# Patient Record
Sex: MALE | Race: WHITE
[De-identification: names, ages, dates, MRNs, and addresses within clinical notes are randomized per-mention and may not be internally consistent; named-entity substitution may affect disease eponyms.]

---

## 2020-04-07 ENCOUNTER — HOSPITAL ENCOUNTER (INPATIENT)
Dept: HOSPITAL 92 - ERS | Age: 55
LOS: 3 days | Discharge: HOME | DRG: 871 | End: 2020-04-10
Attending: FAMILY MEDICINE | Admitting: FAMILY MEDICINE
Payer: COMMERCIAL

## 2020-04-07 VITALS — BODY MASS INDEX: 26.6 KG/M2

## 2020-04-07 DIAGNOSIS — J18.9: ICD-10-CM

## 2020-04-07 DIAGNOSIS — A41.9: Primary | ICD-10-CM

## 2020-04-07 DIAGNOSIS — Z20.828: ICD-10-CM

## 2020-04-07 DIAGNOSIS — J96.01: ICD-10-CM

## 2020-04-07 LAB
ALBUMIN SERPL BCG-MCNC: 3.6 G/DL (ref 3.5–5)
ALP SERPL-CCNC: 110 U/L (ref 40–110)
ALT SERPL W P-5'-P-CCNC: 85 U/L (ref 8–55)
ANION GAP SERPL CALC-SCNC: 18 MMOL/L (ref 10–20)
APTT PPP: 34.3 SEC (ref 22.9–36.1)
AST SERPL-CCNC: 75 U/L (ref 5–34)
BILIRUB SERPL-MCNC: 1.5 MG/DL (ref 0.2–1.2)
BUN SERPL-MCNC: 21 MG/DL (ref 8.4–25.7)
CALCIUM SERPL-MCNC: 9.5 MG/DL (ref 7.8–10.44)
CHLORIDE SERPL-SCNC: 99 MMOL/L (ref 98–107)
CO2 SERPL-SCNC: 23 MMOL/L (ref 22–29)
CREAT CL PREDICTED SERPL C-G-VRATE: 0 ML/MIN (ref 70–130)
D DIMER PPP FEU-MCNC: 1.92 *MCG/ML (ref 0.27–0.43)
GLOBULIN SER CALC-MCNC: 4.6 G/DL (ref 2.4–3.5)
GLUCOSE SERPL-MCNC: 109 MG/DL (ref 70–105)
HGB BLD-MCNC: 16.6 G/DL (ref 14–18)
INR PPP: 1.2
MAGNESIUM SERPL-MCNC: 2.6 MG/DL (ref 1.6–2.6)
MCH RBC QN AUTO: 28.7 PG (ref 27–31)
MCV RBC AUTO: 89.7 FL (ref 78–98)
MDIFF COMPLETE?: YES
PLATELET # BLD AUTO: 450 THOU/UL (ref 130–400)
POTASSIUM SERPL-SCNC: 3.7 MMOL/L (ref 3.5–5.1)
PROTHROMBIN TIME: 15.3 SEC (ref 12–14.7)
RBC # BLD AUTO: 5.79 MILL/UL (ref 4.7–6.1)
SODIUM SERPL-SCNC: 136 MMOL/L (ref 136–145)
WBC # BLD AUTO: 15.1 THOU/UL (ref 4.8–10.8)

## 2020-04-07 PROCEDURE — 87635 SARS-COV-2 COVID-19 AMP PRB: CPT

## 2020-04-07 PROCEDURE — 80053 COMPREHEN METABOLIC PANEL: CPT

## 2020-04-07 PROCEDURE — 87899 AGENT NOS ASSAY W/OPTIC: CPT

## 2020-04-07 PROCEDURE — 83735 ASSAY OF MAGNESIUM: CPT

## 2020-04-07 PROCEDURE — 86140 C-REACTIVE PROTEIN: CPT

## 2020-04-07 PROCEDURE — 82728 ASSAY OF FERRITIN: CPT

## 2020-04-07 PROCEDURE — 93005 ELECTROCARDIOGRAM TRACING: CPT

## 2020-04-07 PROCEDURE — 85025 COMPLETE CBC W/AUTO DIFF WBC: CPT

## 2020-04-07 PROCEDURE — 84100 ASSAY OF PHOSPHORUS: CPT

## 2020-04-07 PROCEDURE — 96365 THER/PROPH/DIAG IV INF INIT: CPT

## 2020-04-07 PROCEDURE — 87149 DNA/RNA DIRECT PROBE: CPT

## 2020-04-07 PROCEDURE — 8E0ZXY6 ISOLATION: ICD-10-PCS | Performed by: FAMILY MEDICINE

## 2020-04-07 PROCEDURE — 80048 BASIC METABOLIC PNL TOTAL CA: CPT

## 2020-04-07 PROCEDURE — 96367 TX/PROPH/DG ADDL SEQ IV INF: CPT

## 2020-04-07 PROCEDURE — 83615 LACTATE (LD) (LDH) ENZYME: CPT

## 2020-04-07 PROCEDURE — 84484 ASSAY OF TROPONIN QUANT: CPT

## 2020-04-07 PROCEDURE — U0003 INFECTIOUS AGENT DETECTION BY NUCLEIC ACID (DNA OR RNA); SEVERE ACUTE RESPIRATORY SYNDROME CORONAVIRUS 2 (SARS-COV-2) (CORONAVIRUS DISEASE [COVID-19]), AMPLIFIED PROBE TECHNIQUE, MAKING USE OF HIGH THROUGHPUT TECHNOLOGIES AS DESCRIBED BY CMS-2020-01-R: HCPCS

## 2020-04-07 PROCEDURE — 87449 NOS EACH ORGANISM AG IA: CPT

## 2020-04-07 PROCEDURE — 87633 RESP VIRUS 12-25 TARGETS: CPT

## 2020-04-07 PROCEDURE — 85379 FIBRIN DEGRADATION QUANT: CPT

## 2020-04-07 PROCEDURE — 85610 PROTHROMBIN TIME: CPT

## 2020-04-07 PROCEDURE — 36415 COLL VENOUS BLD VENIPUNCTURE: CPT

## 2020-04-07 PROCEDURE — 84145 PROCALCITONIN (PCT): CPT

## 2020-04-07 PROCEDURE — 87040 BLOOD CULTURE FOR BACTERIA: CPT

## 2020-04-07 PROCEDURE — 83605 ASSAY OF LACTIC ACID: CPT

## 2020-04-07 PROCEDURE — 85730 THROMBOPLASTIN TIME PARTIAL: CPT

## 2020-04-07 RX ADMIN — CEFTRIAXONE SCH MLS: 1 INJECTION, POWDER, FOR SOLUTION INTRAMUSCULAR; INTRAVENOUS at 21:15

## 2020-04-07 RX ADMIN — AZITHROMYCIN SCH MLS: 500 INJECTION, POWDER, LYOPHILIZED, FOR SOLUTION INTRAVENOUS at 21:15

## 2020-04-07 NOTE — PDOC.FPRHP
- History of Present Illness


Chief Complaint: cough


History of Present Illness: 





53yo previously healthy M presents with 14 day hx of cough, fever, (

consistently above 101), generalized weakness, and decrease PO solid intake. Pt 

denies SOB or CP. 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 

notably ill patients or other sick contacts recently. Denies any other related 

symptoms. Tylenol is somewhat palliating. No other transforming factors.


ED Course: 


Pt was given 1g Tylenol PO, 1L NS IV, 2g Cefepime IV, and 1g Vanc IV








- Allergies/Adverse Reactions


 Allergies











Allergy/AdvReac Type Severity Reaction Status Date / Time


 


No Known Allergies Allergy   Unverified 04/07/20 20:18














- Home Medications


 











 Medication  Instructions  Recorded  Confirmed  Type


 


Acetaminophen [Tylenol Extra 1,000 mg PO Q6H PRN  tab 04/10/20  Rx





Strength]    


 


Amoxicillin 1,000 mg PO TID #12 tablet 04/10/20  Rx


 


Azithromycin 250 mg PO DAILY #2 tablet 04/10/20  Rx


 


Benzonatate [Tessalon] 100 mg PO Q4H PRN #36 cap 04/10/20  Rx














- History


PMHx: none


 


PSHx: Hernia repair





FHx: non contributory


 


Social: denies TAD





Allergies: NKDA





CODE: FULL


 








- Review of Systems


General: reports: fever/chills, weight/appetite/sleep changes


Eyes: denies: eye pain, vision changes


ENT: denies: nasal congestion, rhinorrhea


Respiratory: reports: cough, congestion.  denies: shortness of breath


Cardiovascular: denies: chest pain, palpitation


Gastrointestinal: denies: nausea, vomiting


Genitourinary: denies: incontinence, dysuria


Skin: denies: rashes, lesions


Musculoskeletal: denies: pain, tenderness


Neurological: denies: syncope, seizure


Psychological: denies: anxiety, depression





- Vital signs


BP: 138/88  HR: 77 RR: 24 Tmax: 100.4 Pox: 93% on 3L  Wt: 90.72kg   








- Physical Exam


Constitutional: awake, alert and oriented, other (mild distress, diaphoretic)


HEENT: EOMI, grossly normal vision, grossly normal hearing


Neck: supple, trachea midline


Chest: no lesions


Heart: RRR, normal S1/S2


Lungs: no wheezing


-Lungs: 





bilateral inspiratory and expiratory crackles diffusely, egophany wnl


Abdomen: soft, non-tender


Musculoskeletal: normal structure, normal tone


Neurological: no focal deficit, normal sensation


Skin: no rash/lesions, good turgor


Heme/Lymphatic: no unusual bruising or bleeding, no purpura


Psychiatric: normal mood and affect, good judgment and insight





FMR H&P: Results





- Labs


Result Diagrams: 


 04/10/20 06:31





 04/10/20 06:31


Lab results: 


 











WBC  15.1 thou/uL (4.8-10.8)  H  04/07/20  14:38    


 


Hgb  16.6 g/dL (14.0-18.0)   04/07/20  14:38    


 


Hct  51.9 % (42.0-52.0)   04/07/20  14:38    


 


MCV  89.7 fL (78.0-98.0)   04/07/20  14:38    


 


Plt Count  450 thou/uL (130-400)  H  04/07/20  14:38    


 


Band Neuts % (Manual)  21 % (5-11)  H  04/07/20  14:38    


 


Sodium  136 mmol/L (136-145)   04/07/20  14:38    


 


Potassium  3.7 mmol/L (3.5-5.1)   04/07/20  14:38    


 


Chloride  99 mmol/L ()   04/07/20  14:38    


 


Carbon Dioxide  23 mmol/L (22-29)   04/07/20  14:38    


 


BUN  21 mg/dL (8.4-25.7)   04/07/20  14:38    


 


Creatinine  1.16 mg/dL (0.7-1.3)   04/07/20  14:38    


 


Glucose  109 mg/dL ()  H  04/07/20  14:38    


 


Lactic Acid  1.5 mmol/L (0.5-2.2)   04/07/20  14:38    


 


Calcium  9.5 mg/dL (7.8-10.44)   04/07/20  14:38    


 


Total Bilirubin  1.5 mg/dL (0.2-1.2)  H  04/07/20  14:38    


 


AST  75 U/L (5-34)  H  04/07/20  14:38    


 


ALT  85 U/L (8-55)  H  04/07/20  14:38    


 


Alkaline Phosphatase  110 U/L ()   04/07/20  14:38    


 


Serum Total Protein  8.2 g/dL (6.0-8.3)   04/07/20  14:38    


 


Albumin  3.6 g/dL (3.5-5.0)   04/07/20  14:38    














- EKG Interpretation


EKG: 





NSR at rate of 100, no ST changes noted. 





FMR H&P: A/P





- Problem List


(1) Sepsis with acute hypoxic respiratory failure


Status: Acute   Code(s): A41.9 - SEPSIS, UNSPECIFIED ORGANISM; R65.20 - SEVERE 

SEPSIS WITHOUT SEPTIC SHOCK; J96.01 - ACUTE RESPIRATORY FAILURE WITH HYPOXIA   





- Plan





Sepsis and hypoxic respiratory failure 2/2 suspected COVID-19 vs. CAP


A- requiring 3L NC in ER. Physical exam impressive as well as CXR with 

infiltrate and pattern characteristic of covid infection. Labs significant for 

leukocytosis with lymphopenia. Will treat for CAP and test for COVID-19. pt got 

cefepime 1L IVF and vanc in ER


P- azithro and rocephin


-PO hydration, BP stable


-BCx


-RVP


-COVID-19


-covid labs: ddimer, ferritin, LDH, CRP, mag, phos, procal





CODE: FULL


dispo: inpt, medical


IVF: KVO


diet: regular





Addendum - Attending





- Attending Attestation


Date/Time: 05/01/20 2053





I personally evaluated the patient and discussed the management with Dr. De La Rosa on 4/7/20.


I agree with the History, Examination, Assessment and Plan documented above 

with any addition or exceptions noted below.


54 y.o. WM w/o sig PMH here with cough, fever, congestion and low SaO2 on RA.  

Will treat for atypical PNA and r/o COVID as PUI.

## 2020-04-08 LAB
ANION GAP SERPL CALC-SCNC: 12 MMOL/L (ref 10–20)
BASOPHILS # BLD AUTO: 0 THOU/UL (ref 0–0.2)
BASOPHILS NFR BLD AUTO: 0.4 % (ref 0–1)
BUN SERPL-MCNC: 18 MG/DL (ref 8.4–25.7)
CALCIUM SERPL-MCNC: 8.6 MG/DL (ref 7.8–10.44)
CHLORIDE SERPL-SCNC: 103 MMOL/L (ref 98–107)
CO2 SERPL-SCNC: 26 MMOL/L (ref 22–29)
CREAT CL PREDICTED SERPL C-G-VRATE: 107 ML/MIN (ref 70–130)
EOSINOPHIL # BLD AUTO: 0.1 THOU/UL (ref 0–0.7)
EOSINOPHIL NFR BLD AUTO: 1 % (ref 0–10)
GLUCOSE SERPL-MCNC: 104 MG/DL (ref 70–105)
HGB BLD-MCNC: 14.5 G/DL (ref 14–18)
LYMPHOCYTES # BLD: 0.8 THOU/UL (ref 1.2–3.4)
LYMPHOCYTES NFR BLD AUTO: 7 % (ref 21–51)
MCH RBC QN AUTO: 29 PG (ref 27–31)
MCV RBC AUTO: 90.3 FL (ref 78–98)
MONOCYTES # BLD AUTO: 0.9 THOU/UL (ref 0.11–0.59)
MONOCYTES NFR BLD AUTO: 8.1 % (ref 0–10)
NEUTROPHILS # BLD AUTO: 9.5 THOU/UL (ref 1.4–6.5)
NEUTROPHILS NFR BLD AUTO: 83.6 % (ref 42–75)
PLATELET # BLD AUTO: 348 THOU/UL (ref 130–400)
POTASSIUM SERPL-SCNC: 4.2 MMOL/L (ref 3.5–5.1)
RBC # BLD AUTO: 5 MILL/UL (ref 4.7–6.1)
S PNEUM AG UR QL: NEGATIVE
SODIUM SERPL-SCNC: 137 MMOL/L (ref 136–145)
WBC # BLD AUTO: 11.4 THOU/UL (ref 4.8–10.8)

## 2020-04-08 RX ADMIN — CEFTRIAXONE SCH MLS: 1 INJECTION, POWDER, FOR SOLUTION INTRAMUSCULAR; INTRAVENOUS at 20:52

## 2020-04-08 RX ADMIN — AZITHROMYCIN SCH MLS: 500 INJECTION, POWDER, LYOPHILIZED, FOR SOLUTION INTRAVENOUS at 20:52

## 2020-04-08 NOTE — PDOC.FM
- Subjective


Subjective: 





stable from yesterday, reports no new problems, improvement on subjective fevers

, no sob, stable cough.





- Objective


Vital Signs & Weight: 


 Vital Signs (12 hours)











  Temp Pulse Resp BP Pulse Ox


 


 04/08/20 04:00  98.5 F  73  18   93 L


 


 04/08/20 02:32  98.5 F  83  18   91 L


 


 04/08/20 00:40  101 F H  94  18   90 L


 


 04/07/20 23:57  101.9 F H  96  18  148/89 H  91 L


 


 04/07/20 21:39      95








 Weight











Weight                         88.904 kg














Result Diagrams: 


 04/10/20 06:31





 04/10/20 06:31





Phys Exam





- Physical Examination


Constitutional: NAD


HEENT: moist MMs, sclera anicteric


Neck: supple, full ROM


Respiratory: no wheezing


bilat diffuse crackles


Cardiovascular: RRR, no significant murmur


Gastrointestinal: soft, non-tender


Musculoskeletal: no edema, pulses present


Neurological: non-focal, normal sensation


Psychiatric: normal affect, A&O x 3


Skin: no rash, normal turgor





Dx/Plan


(1) Sepsis with acute hypoxic respiratory failure


Code(s): A41.9 - SEPSIS, UNSPECIFIED ORGANISM; R65.20 - SEVERE SEPSIS WITHOUT 

SEPTIC SHOCK; J96.01 - ACUTE RESPIRATORY FAILURE WITH HYPOXIA   Status: Acute   





- Plan


Plan: 





Sepsis and hypoxic respiratory failure 2/2 suspected COVID-19 vs. CAP


A- stable. requiring 3L NC. Physical exam impressive as well as CXR with 

infiltrate and pattern characteristic of covid infection. Labs significant for 

leukocytosis with lymphopenia. pt got cefepime 1L IVF and vanc in ER


P- azithro and rocephin


-PO hydration, BP stable


-f/u BCx


-f/u COVID-19





CODE: FULL


IVF: KVO


diet: regular





Addendum - Attending





- Attending Attestation


Date/Time: 05/01/20 2414





I personally evaluated the patient and discussed the management with Dr. De La Rosa on 4/8/20.


I agree with the History, Examination, Assessment and Plan documented above 

with any addition or exceptions noted below.


Fever w/in last 24 hrs.  Remains on O2 dependent to keep SaO2.  COVID  pending.

  Continue current care.

## 2020-04-09 LAB
ANION GAP SERPL CALC-SCNC: 15 MMOL/L (ref 10–20)
BASOPHILS # BLD AUTO: 0 THOU/UL (ref 0–0.2)
BASOPHILS NFR BLD AUTO: 0.2 % (ref 0–1)
BUN SERPL-MCNC: 17 MG/DL (ref 8.4–25.7)
CALCIUM SERPL-MCNC: 8.8 MG/DL (ref 7.8–10.44)
CHLORIDE SERPL-SCNC: 103 MMOL/L (ref 98–107)
CO2 SERPL-SCNC: 25 MMOL/L (ref 22–29)
CREAT CL PREDICTED SERPL C-G-VRATE: 126 ML/MIN (ref 70–130)
EOSINOPHIL # BLD AUTO: 0.2 THOU/UL (ref 0–0.7)
EOSINOPHIL NFR BLD AUTO: 2.4 % (ref 0–10)
GLUCOSE SERPL-MCNC: 93 MG/DL (ref 70–105)
HGB BLD-MCNC: 14.4 G/DL (ref 14–18)
LYMPHOCYTES # BLD: 0.7 THOU/UL (ref 1.2–3.4)
LYMPHOCYTES NFR BLD AUTO: 7.3 % (ref 21–51)
MCH RBC QN AUTO: 29.6 PG (ref 27–31)
MCV RBC AUTO: 90.6 FL (ref 78–98)
MONOCYTES # BLD AUTO: 0.7 THOU/UL (ref 0.11–0.59)
MONOCYTES NFR BLD AUTO: 7.9 % (ref 0–10)
NEUTROPHILS # BLD AUTO: 7.5 THOU/UL (ref 1.4–6.5)
NEUTROPHILS NFR BLD AUTO: 82.1 % (ref 42–75)
PLATELET # BLD AUTO: 355 THOU/UL (ref 130–400)
POTASSIUM SERPL-SCNC: 3.7 MMOL/L (ref 3.5–5.1)
RBC # BLD AUTO: 4.87 MILL/UL (ref 4.7–6.1)
SODIUM SERPL-SCNC: 139 MMOL/L (ref 136–145)
WBC # BLD AUTO: 9.1 THOU/UL (ref 4.8–10.8)

## 2020-04-09 RX ADMIN — ALBUTEROL SULFATE SCH PUFF: 108 AEROSOL, METERED RESPIRATORY (INHALATION) at 20:33

## 2020-04-09 RX ADMIN — CEFTRIAXONE SCH MLS: 1 INJECTION, POWDER, FOR SOLUTION INTRAMUSCULAR; INTRAVENOUS at 22:06

## 2020-04-09 RX ADMIN — AZITHROMYCIN SCH MLS: 500 INJECTION, POWDER, LYOPHILIZED, FOR SOLUTION INTRAVENOUS at 20:32

## 2020-04-09 NOTE — PDOC.FM
- Subjective


Subjective: 





Pt reports Sx are stable from admission, perhaps mild improvement, denies new 

problems





- Objective


Vital Signs & Weight: 


 Vital Signs (12 hours)











  Temp Pulse Resp BP Pulse Ox


 


 04/09/20 08:00  98.6 F  78  16  130/78  94 L


 


 04/09/20 04:00  98.6 F  76  18  144/79 H  92 L


 


 04/09/20 00:00  97.7 F  67  18   92 L








 Weight











Admit Weight                   88.904 kg


 


Weight                         88.904 kg














Result Diagrams: 


 04/10/20 06:31





 04/10/20 06:31





Phys Exam





- Physical Examination


Constitutional: NAD


HEENT: moist MMs, sclera anicteric


Neck: no nodes, no JVD


Respiratory: no wheezing


bilat diffuse crackles


Cardiovascular: RRR, no significant murmur


Gastrointestinal: soft, non-tender


Musculoskeletal: no edema, pulses present


Neurological: normal sensation, moves all 4 limbs


Psychiatric: normal affect, A&O x 3


Skin: no rash, normal turgor





Dx/Plan


(1) Sepsis with acute hypoxic respiratory failure


Code(s): A41.9 - SEPSIS, UNSPECIFIED ORGANISM; R65.20 - SEVERE SEPSIS WITHOUT 

SEPTIC SHOCK; J96.01 - ACUTE RESPIRATORY FAILURE WITH HYPOXIA   Status: Acute   





- Plan


Plan: 





Sepsis and hypoxic respiratory failure 2/2 suspected COVID-19 vs. CAP


A- stable. requiring 3L NC. Physical exam impressive as well as CXR with 

infiltrate and pattern characteristic of covid infection. Labs significant for 

leukocytosis with lymphopenia. pt got cefepime 1L IVF and vanc in ER


P- azithro and rocephin


-PO hydration, BP stable


-f/u BCx


-f/u COVID-19





CODE: FULL


IVF: KVO


diet: regular





Addendum - Attending





- Attending Attestation


Date/Time: 05/02/20 1105





I personally evaluated the patient and discussed the management with Dr. De La Rosa on 4/9/20.


I agree with the History, Examination, Assessment and Plan documented above 

with any addition or exceptions noted below.


Unchanged.  On O2.  COVID pending.

## 2020-04-10 VITALS — DIASTOLIC BLOOD PRESSURE: 83 MMHG | SYSTOLIC BLOOD PRESSURE: 129 MMHG | TEMPERATURE: 97.6 F

## 2020-04-10 LAB
ANION GAP SERPL CALC-SCNC: 13 MMOL/L (ref 10–20)
BASOPHILS # BLD AUTO: 0.1 THOU/UL (ref 0–0.2)
BASOPHILS NFR BLD AUTO: 0.9 % (ref 0–1)
BUN SERPL-MCNC: 15 MG/DL (ref 8.4–25.7)
CALCIUM SERPL-MCNC: 8.6 MG/DL (ref 7.8–10.44)
CHLORIDE SERPL-SCNC: 104 MMOL/L (ref 98–107)
CO2 SERPL-SCNC: 25 MMOL/L (ref 22–29)
CREAT CL PREDICTED SERPL C-G-VRATE: 130 ML/MIN (ref 70–130)
EOSINOPHIL # BLD AUTO: 0.2 THOU/UL (ref 0–0.7)
EOSINOPHIL NFR BLD AUTO: 2.2 % (ref 0–10)
GLUCOSE SERPL-MCNC: 93 MG/DL (ref 70–105)
HGB BLD-MCNC: 14.3 G/DL (ref 14–18)
LYMPHOCYTES # BLD: 1 THOU/UL (ref 1.2–3.4)
LYMPHOCYTES NFR BLD AUTO: 11.5 % (ref 21–51)
MCH RBC QN AUTO: 29.5 PG (ref 27–31)
MCV RBC AUTO: 91.1 FL (ref 78–98)
MONOCYTES # BLD AUTO: 0.7 THOU/UL (ref 0.11–0.59)
MONOCYTES NFR BLD AUTO: 8.6 % (ref 0–10)
NEUTROPHILS # BLD AUTO: 6.5 THOU/UL (ref 1.4–6.5)
NEUTROPHILS NFR BLD AUTO: 76.8 % (ref 42–75)
PLATELET # BLD AUTO: 404 THOU/UL (ref 130–400)
POTASSIUM SERPL-SCNC: 3.7 MMOL/L (ref 3.5–5.1)
RBC # BLD AUTO: 4.85 MILL/UL (ref 4.7–6.1)
SODIUM SERPL-SCNC: 138 MMOL/L (ref 136–145)
WBC # BLD AUTO: 8.5 THOU/UL (ref 4.8–10.8)

## 2020-04-10 RX ADMIN — ALBUTEROL SULFATE SCH PUFF: 108 AEROSOL, METERED RESPIRATORY (INHALATION) at 05:59

## 2020-04-10 NOTE — PDOC.FM
- Subjective


Subjective: 





pt feels improved from yesterday, now on RA. no new complaints





- Objective


Vital Signs & Weight: 


 Vital Signs (12 hours)











  Temp Pulse Resp BP Pulse Ox


 


 04/10/20 04:16  98.7 F  73  18  125/80  92 L


 


 04/09/20 23:24  98.3 F  61  20  131/84  93 L








 Weight











Admit Weight                   88.904 kg


 


Weight                         88.904 kg














Result Diagrams: 


 04/10/20 06:31





 04/10/20 06:31





Phys Exam





- Physical Examination


Constitutional: NAD


HEENT: moist MMs, sclera anicteric


Neck: no JVD, supple


Respiratory: no wheezing


bilat diffuse fine crackles


Cardiovascular: RRR, no significant murmur


Gastrointestinal: soft, non-tender


Musculoskeletal: no edema, pulses present


Neurological: normal sensation, moves all 4 limbs


Psychiatric: normal affect, A&O x 3





Dx/Plan


(1) Sepsis with acute hypoxic respiratory failure


Code(s): A41.9 - SEPSIS, UNSPECIFIED ORGANISM; R65.20 - SEVERE SEPSIS WITHOUT 

SEPTIC SHOCK; J96.01 - ACUTE RESPIRATORY FAILURE WITH HYPOXIA   Status: Acute   





- Plan


Plan: 





Sepsis and hypoxic respiratory failure 2/2 suspected COVID-19 vs. CAP


A- improved clinically, covid test is negative however Physical exam impressive 

as well as CXR with infiltrate and pattern characteristic of covid infection. 

Labs significant for leukocytosis with lymphopenia. pt got cefepime 1L IVF and 

vanc in ER


P- azithro and rocephin


-PO hydration, BP stable


-consider retesting COVID-19


-possible DC home





CODE: FULL


IVF: KVO


diet: regular

## 2020-04-10 NOTE — DIS
DATE OF ADMISSION:  04/07/2020



DATE OF DISCHARGE:  04/10/2020



RESIDENT:  Mark De La Rosa MD 



I personally saw this patient for a total of 4 days.



ADMITTING ATTENDING:  Mark Feliciano MD



DISCHARGE ATTENDING:  Mark Feliciano MD



CONSULTS:  None.



PROCEDURES:  On 04/07/2020, chest x-ray; impression, questionable and minimally

increased interstitial lung changes in the left mid lung field.  There is some

minimal increased density in the left mid lung field.  This could represent subtle

ground-glass changes of COVID. 



DISCHARGE MEDICATIONS:  

1. Amoxicillin 1000 mg p.o. t.i.d. x2 more days.

2. Azithromycin 250 mg p.o. daily x2 days.

3. Tessalon 100 mg p.o. q.4 hours p.r.n., 36 tablets.



DISCONTINUED MEDICATIONS:  None.



PRIMARY DIAGNOSES:  Sepsis and hypoxic respiratory failure secondary to

community-acquired pneumonia. 



SECONDARY DIAGNOSIS:  None.



HISTORY OF PRESENT ILLNESS AND HOSPITAL COURSE:  This is a 54-year-old male who

presented for fever, upper respiratory tract infection symptoms, and shortness of

breath and was admitted for sepsis and hypoxic respiratory failure secondary to

either a pneumonia or pneumonia and COVID-19.  The patient had already had one

outpatient test earlier the week prior to presentation, which was negative.

However, the patient had characteristic chest x-ray and lab work on presentation and

therefore, I was still concerned that he may have infection.  Notably, the patient

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

was tested again in addition to being treated for his hypoxic respiratory failure

and pneumonia.  The patient was treated for community-acquired pneumonia with

Rocephin and azithromycin and improved rapidly going from requiring 2 to 3 L of

oxygen to room air over the span of a couple of days.  His COVID-19 retest came back

negative also as did his blood cultures, and the patient was discharged home in

stable condition with plans for 2 additional days of antibiotics and return

precautions. 



DISPOSITION:  Stable.



DISCHARGE INSTRUCTIONS:  

1. Location:  Home.

2. Activity:  As tolerated.

3. Followup:  Follow up with primary care physician in 7 days.

4. Diet:  Regular.







Job ID:  [id number]

## 2023-04-25 ENCOUNTER — HOSPITAL ENCOUNTER (OUTPATIENT)
Dept: HOSPITAL 92 - ERS | Age: 58
Setting detail: OBSERVATION
Discharge: HOME | End: 2023-04-25
Attending: INTERNAL MEDICINE | Admitting: INTERNAL MEDICINE
Payer: COMMERCIAL

## 2023-04-25 DIAGNOSIS — I48.0: Primary | ICD-10-CM

## 2023-04-25 DIAGNOSIS — I08.1: ICD-10-CM

## 2023-04-25 DIAGNOSIS — I49.3: ICD-10-CM

## 2023-04-25 LAB
ALBUMIN SERPL BCG-MCNC: 3.8 G/DL (ref 3.5–5)
ALP SERPL-CCNC: 55 U/L (ref 40–110)
ALT SERPL W P-5'-P-CCNC: 16 U/L (ref 8–55)
ANION GAP SERPL CALC-SCNC: 13 MMOL/L (ref 10–20)
AST SERPL-CCNC: 20 U/L (ref 5–34)
BASOPHILS # BLD AUTO: 0.1 THOU/UL (ref 0–0.2)
BASOPHILS NFR BLD AUTO: 1 % (ref 0–1)
BILIRUB SERPL-MCNC: 0.4 MG/DL (ref 0.2–1.2)
BUN SERPL-MCNC: 19 MG/DL (ref 8.4–25.7)
CALCIUM SERPL-MCNC: 9.2 MG/DL (ref 7.8–10.44)
CHLORIDE SERPL-SCNC: 105 MMOL/L (ref 98–107)
CO2 SERPL-SCNC: 27 MMOL/L (ref 22–29)
CREAT CL PREDICTED SERPL C-G-VRATE: 0 ML/MIN (ref 70–130)
EOSINOPHIL # BLD AUTO: 0.1 THOU/UL (ref 0–0.7)
EOSINOPHIL NFR BLD AUTO: 1.3 % (ref 0–10)
GLOBULIN SER CALC-MCNC: 3 G/DL (ref 2.4–3.5)
GLUCOSE SERPL-MCNC: 94 MG/DL (ref 70–105)
HGB BLD-MCNC: 18.9 G/DL (ref 14–18)
LIPASE SERPL-CCNC: 21 U/L (ref 8–78)
LYMPHOCYTES # BLD: 1.8 THOU/UL (ref 1.2–3.4)
LYMPHOCYTES NFR BLD AUTO: 22.2 % (ref 21–51)
MAGNESIUM SERPL-MCNC: 1.7 MG/DL (ref 1.6–2.6)
MCH RBC QN AUTO: 31.5 PG (ref 27–31)
MCV RBC AUTO: 90.5 FL (ref 78–98)
MONOCYTES # BLD AUTO: 0.5 THOU/UL (ref 0.11–0.59)
MONOCYTES NFR BLD AUTO: 5.7 % (ref 0–10)
NEUTROPHILS # BLD AUTO: 5.7 THOU/UL (ref 1.4–6.5)
NEUTROPHILS NFR BLD AUTO: 69.7 % (ref 42–75)
PLATELET # BLD AUTO: 261 10X3/UL (ref 130–400)
POTASSIUM SERPL-SCNC: 4.7 MMOL/L (ref 3.5–5.1)
RBC # BLD AUTO: 5.99 MILL/UL (ref 4.7–6.1)
SODIUM SERPL-SCNC: 140 MMOL/L (ref 136–145)
T4 FREE SERPL-MCNC: 0.94 NG/DL (ref 0.7–1.48)
TROPONIN I SERPL DL<=0.01 NG/ML-MCNC: 0.02 NG/ML (ref ?–0.03)
TSH SERPL DL<=0.005 MIU/L-ACNC: 1.44 UIU/ML (ref 0.35–4.94)
WBC # BLD AUTO: 8.1 10X3/UL (ref 4.8–10.8)

## 2023-04-25 PROCEDURE — 36415 COLL VENOUS BLD VENIPUNCTURE: CPT

## 2023-04-25 PROCEDURE — 93306 TTE W/DOPPLER COMPLETE: CPT

## 2023-04-25 PROCEDURE — 83880 ASSAY OF NATRIURETIC PEPTIDE: CPT

## 2023-04-25 PROCEDURE — 71045 X-RAY EXAM CHEST 1 VIEW: CPT

## 2023-04-25 PROCEDURE — 85025 COMPLETE CBC W/AUTO DIFF WBC: CPT

## 2023-04-25 PROCEDURE — G0378 HOSPITAL OBSERVATION PER HR: HCPCS

## 2023-04-25 PROCEDURE — 96360 HYDRATION IV INFUSION INIT: CPT

## 2023-04-25 PROCEDURE — 84439 ASSAY OF FREE THYROXINE: CPT

## 2023-04-25 PROCEDURE — 84484 ASSAY OF TROPONIN QUANT: CPT

## 2023-04-25 PROCEDURE — 80053 COMPREHEN METABOLIC PANEL: CPT

## 2023-04-25 PROCEDURE — 83735 ASSAY OF MAGNESIUM: CPT

## 2023-04-25 PROCEDURE — 83690 ASSAY OF LIPASE: CPT

## 2023-04-25 PROCEDURE — 84443 ASSAY THYROID STIM HORMONE: CPT

## 2023-04-25 PROCEDURE — 84481 FREE ASSAY (FT-3): CPT

## 2023-04-25 PROCEDURE — 93005 ELECTROCARDIOGRAM TRACING: CPT

## 2023-05-11 LAB
ANION GAP SERPL CALC-SCNC: 14 MMOL/L (ref 10–20)
BUN SERPL-MCNC: 16 MG/DL (ref 8.4–25.7)
CALCIUM SERPL-MCNC: 9 MG/DL (ref 7.8–10.44)
CHLORIDE SERPL-SCNC: 105 MMOL/L (ref 98–107)
CO2 SERPL-SCNC: 25 MMOL/L (ref 22–29)
CREAT CL PREDICTED SERPL C-G-VRATE: 98 ML/MIN (ref 70–130)
GLUCOSE SERPL-MCNC: 91 MG/DL (ref 70–105)
HGB BLD-MCNC: 17 G/DL (ref 13.5–17.5)
INR PPP: 1
MCH RBC QN AUTO: 29.5 PG (ref 27–33)
MCV RBC AUTO: 87 FL (ref 81.2–95.1)
PLATELET # BLD AUTO: 281 10X3/UL (ref 150–450)
POTASSIUM SERPL-SCNC: 4.5 MMOL/L (ref 3.5–5.1)
PROTHROMBIN TIME: 10.8 SEC (ref 9.5–12.1)
RBC # BLD AUTO: 5.77 10X6/UL (ref 4.32–5.72)
SODIUM SERPL-SCNC: 139 MMOL/L (ref 136–145)
WBC # BLD AUTO: 6.3 10X3/UL (ref 3.5–10.5)

## 2023-05-16 ENCOUNTER — HOSPITAL ENCOUNTER (OUTPATIENT)
Dept: HOSPITAL 92 - SDC | Age: 58
Discharge: HOME | End: 2023-05-16
Attending: INTERNAL MEDICINE
Payer: OTHER GOVERNMENT

## 2023-05-16 VITALS — BODY MASS INDEX: 29.8 KG/M2

## 2023-05-16 DIAGNOSIS — E11.9: ICD-10-CM

## 2023-05-16 DIAGNOSIS — I10: ICD-10-CM

## 2023-05-16 DIAGNOSIS — R00.1: ICD-10-CM

## 2023-05-16 DIAGNOSIS — Z79.899: ICD-10-CM

## 2023-05-16 DIAGNOSIS — I48.0: Primary | ICD-10-CM

## 2023-05-16 DIAGNOSIS — Z79.01: ICD-10-CM

## 2023-05-16 PROCEDURE — C1759 CATH, INTRA ECHOCARDIOGRAPHY: HCPCS

## 2023-05-16 PROCEDURE — C1732 CATH, EP, DIAG/ABL, 3D/VECT: HCPCS

## 2023-05-16 PROCEDURE — 93312 ECHO TRANSESOPHAGEAL: CPT

## 2023-05-16 PROCEDURE — 85610 PROTHROMBIN TIME: CPT

## 2023-05-16 PROCEDURE — 02K83ZZ MAP CONDUCTION MECHANISM, PERCUTANEOUS APPROACH: ICD-10-PCS | Performed by: INTERNAL MEDICINE

## 2023-05-16 PROCEDURE — 4A0234Z MEASUREMENT OF CARDIAC ELECTRICAL ACTIVITY, PERCUTANEOUS APPROACH: ICD-10-PCS | Performed by: INTERNAL MEDICINE

## 2023-05-16 PROCEDURE — 02583ZZ DESTRUCTION OF CONDUCTION MECHANISM, PERCUTANEOUS APPROACH: ICD-10-PCS | Performed by: INTERNAL MEDICINE

## 2023-05-16 PROCEDURE — 85027 COMPLETE CBC AUTOMATED: CPT

## 2023-05-16 PROCEDURE — C1769 GUIDE WIRE: HCPCS

## 2023-05-16 PROCEDURE — C1760 CLOSURE DEV, VASC: HCPCS

## 2023-05-16 PROCEDURE — C1894 INTRO/SHEATH, NON-LASER: HCPCS

## 2023-05-16 PROCEDURE — 93656 COMPRE EP EVAL ABLTJ ATR FIB: CPT

## 2023-05-16 PROCEDURE — C2630 CATH, EP, COOL-TIP: HCPCS

## 2023-05-16 PROCEDURE — 85347 COAGULATION TIME ACTIVATED: CPT

## 2023-05-16 PROCEDURE — 4A023FZ MEASUREMENT OF CARDIAC RHYTHM, PERCUTANEOUS APPROACH: ICD-10-PCS | Performed by: INTERNAL MEDICINE

## 2023-05-16 PROCEDURE — B246ZZ4 ULTRASONOGRAPHY OF RIGHT AND LEFT HEART, TRANSESOPHAGEAL: ICD-10-PCS | Performed by: INTERNAL MEDICINE

## 2023-05-16 PROCEDURE — C1730 CATH, EP, 19 OR FEW ELECT: HCPCS

## 2023-05-16 PROCEDURE — 80048 BASIC METABOLIC PNL TOTAL CA: CPT

## 2023-05-16 PROCEDURE — 93005 ELECTROCARDIOGRAM TRACING: CPT
